# Patient Record
Sex: FEMALE | Race: WHITE | Employment: FULL TIME | URBAN - METROPOLITAN AREA
[De-identification: names, ages, dates, MRNs, and addresses within clinical notes are randomized per-mention and may not be internally consistent; named-entity substitution may affect disease eponyms.]

---

## 2024-08-18 ENCOUNTER — HOSPITAL ENCOUNTER (EMERGENCY)
Age: 49
Discharge: HOME | End: 2024-08-18
Payer: COMMERCIAL

## 2024-08-18 ENCOUNTER — APPOINTMENT (OUTPATIENT)
Dept: GENERAL RADIOLOGY | Age: 49
End: 2024-08-18
Payer: COMMERCIAL

## 2024-08-18 ENCOUNTER — APPOINTMENT (OUTPATIENT)
Dept: CT IMAGING | Age: 49
End: 2024-08-18
Payer: COMMERCIAL

## 2024-08-18 VITALS
RESPIRATION RATE: 16 BRPM | TEMPERATURE: 98.1 F | HEIGHT: 68 IN | BODY MASS INDEX: 42.44 KG/M2 | WEIGHT: 280 LBS | OXYGEN SATURATION: 99 % | HEART RATE: 66 BPM | DIASTOLIC BLOOD PRESSURE: 72 MMHG | SYSTOLIC BLOOD PRESSURE: 122 MMHG

## 2024-08-18 DIAGNOSIS — S80.01XA CONTUSION OF RIGHT KNEE, INITIAL ENCOUNTER: ICD-10-CM

## 2024-08-18 DIAGNOSIS — S39.012A STRAIN OF LUMBAR REGION, INITIAL ENCOUNTER: ICD-10-CM

## 2024-08-18 DIAGNOSIS — S76.912A MUSCLE STRAIN OF LEFT THIGH, INITIAL ENCOUNTER: Primary | ICD-10-CM

## 2024-08-18 LAB
HCG, URINE, POC: NEGATIVE
Lab: NORMAL
NEGATIVE QC PASS/FAIL: NORMAL
POSITIVE QC PASS/FAIL: NORMAL

## 2024-08-18 PROCEDURE — 73562 X-RAY EXAM OF KNEE 3: CPT

## 2024-08-18 PROCEDURE — 96372 THER/PROPH/DIAG INJ SC/IM: CPT

## 2024-08-18 PROCEDURE — 73590 X-RAY EXAM OF LOWER LEG: CPT

## 2024-08-18 PROCEDURE — 73502 X-RAY EXAM HIP UNI 2-3 VIEWS: CPT

## 2024-08-18 PROCEDURE — 73552 X-RAY EXAM OF FEMUR 2/>: CPT

## 2024-08-18 PROCEDURE — 6370000000 HC RX 637 (ALT 250 FOR IP): Performed by: NURSE PRACTITIONER

## 2024-08-18 PROCEDURE — 72131 CT LUMBAR SPINE W/O DYE: CPT

## 2024-08-18 PROCEDURE — 6360000002 HC RX W HCPCS: Performed by: NURSE PRACTITIONER

## 2024-08-18 PROCEDURE — 99284 EMERGENCY DEPT VISIT MOD MDM: CPT

## 2024-08-18 RX ORDER — KETOROLAC TROMETHAMINE 30 MG/ML
30 INJECTION, SOLUTION INTRAMUSCULAR; INTRAVENOUS ONCE
Status: COMPLETED | OUTPATIENT
Start: 2024-08-18 | End: 2024-08-18

## 2024-08-18 RX ORDER — CYCLOBENZAPRINE HCL 10 MG
10 TABLET ORAL ONCE
Status: COMPLETED | OUTPATIENT
Start: 2024-08-18 | End: 2024-08-18

## 2024-08-18 RX ORDER — CYCLOBENZAPRINE HCL 5 MG
5 TABLET ORAL 2 TIMES DAILY PRN
Qty: 8 TABLET | Refills: 0 | Status: SHIPPED | OUTPATIENT
Start: 2024-08-18 | End: 2024-08-22

## 2024-08-18 RX ORDER — IBUPROFEN 800 MG/1
800 TABLET ORAL EVERY 8 HOURS PRN
Qty: 12 TABLET | Refills: 0 | Status: SHIPPED | OUTPATIENT
Start: 2024-08-18 | End: 2024-08-22

## 2024-08-18 RX ADMIN — CYCLOBENZAPRINE 10 MG: 10 TABLET, FILM COATED ORAL at 14:19

## 2024-08-18 RX ADMIN — KETOROLAC TROMETHAMINE 30 MG: 30 INJECTION, SOLUTION INTRAMUSCULAR at 14:18

## 2024-08-18 ASSESSMENT — PAIN SCALES - GENERAL
PAINLEVEL_OUTOF10: 8
PAINLEVEL_OUTOF10: 6

## 2024-08-18 ASSESSMENT — PAIN DESCRIPTION - ORIENTATION
ORIENTATION: LEFT
ORIENTATION: LEFT

## 2024-08-18 ASSESSMENT — PAIN DESCRIPTION - LOCATION
LOCATION: LEG;HIP
LOCATION: LEG

## 2024-08-18 ASSESSMENT — PAIN DESCRIPTION - DESCRIPTORS: DESCRIPTORS: BURNING;SHOOTING;SHARP

## 2024-08-18 ASSESSMENT — PAIN - FUNCTIONAL ASSESSMENT: PAIN_FUNCTIONAL_ASSESSMENT: 0-10

## 2024-08-18 NOTE — ED PROVIDER NOTES
acute bony abnormality.   3. Mild to moderate degenerative changes seen within the left ankle.         XR FEMUR LEFT (MIN 2 VIEWS)   Final Result   1. No acute bony abnormality seen within the pelvis, left hip, left femur,   left knee, left tibia and fibula, or right knee.   2. Moderate degenerative changes seen within the left knee and right knee.   No joint effusion or acute bony abnormality.   3. Mild to moderate degenerative changes seen within the left ankle.         XR HIP 2-3 VW W PELVIS LEFT   Final Result   1. No acute bony abnormality seen within the pelvis, left hip, left femur,   left knee, left tibia and fibula, or right knee.   2. Moderate degenerative changes seen within the left knee and right knee.   No joint effusion or acute bony abnormality.   3. Mild to moderate degenerative changes seen within the left ankle.         XR KNEE LEFT (3 VIEWS)   Final Result   1. No acute bony abnormality seen within the pelvis, left hip, left femur,   left knee, left tibia and fibula, or right knee.   2. Moderate degenerative changes seen within the left knee and right knee.   No joint effusion or acute bony abnormality.   3. Mild to moderate degenerative changes seen within the left ankle.         XR TIBIA FIBULA LEFT (2 VIEWS)   Final Result   1. No acute bony abnormality seen within the pelvis, left hip, left femur,   left knee, left tibia and fibula, or right knee.   2. Moderate degenerative changes seen within the left knee and right knee.   No joint effusion or acute bony abnormality.   3. Mild to moderate degenerative changes seen within the left ankle.         CT LUMBAR SPINE WO CONTRAST   Final Result   Unremarkable non-contrast CT of the lumbar spine.           ED Course / Medical Decision Making     Medications   ketorolac (TORADOL) injection 30 mg (30 mg IntraMUSCular Given 8/18/24 1418)   cyclobenzaprine (FLEXERIL) tablet 10 mg (10 mg Oral Given 8/18/24 1419)     ED Course as of 08/18/24 1915   Sun

## 2024-08-18 NOTE — DISCHARGE INSTRUCTIONS
XR KNEE RIGHT (3 VIEWS)   Final Result   1. No acute bony abnormality seen within the pelvis, left hip, left femur,   left knee, left tibia and fibula, or right knee.   2. Moderate degenerative changes seen within the left knee and right knee.   No joint effusion or acute bony abnormality.   3. Mild to moderate degenerative changes seen within the left ankle.         XR FEMUR LEFT (MIN 2 VIEWS)   Final Result   1. No acute bony abnormality seen within the pelvis, left hip, left femur,   left knee, left tibia and fibula, or right knee.   2. Moderate degenerative changes seen within the left knee and right knee.   No joint effusion or acute bony abnormality.   3. Mild to moderate degenerative changes seen within the left ankle.         XR HIP 2-3 VW W PELVIS LEFT   Final Result   1. No acute bony abnormality seen within the pelvis, left hip, left femur,   left knee, left tibia and fibula, or right knee.   2. Moderate degenerative changes seen within the left knee and right knee.   No joint effusion or acute bony abnormality.   3. Mild to moderate degenerative changes seen within the left ankle.         XR KNEE LEFT (3 VIEWS)   Final Result   1. No acute bony abnormality seen within the pelvis, left hip, left femur,   left knee, left tibia and fibula, or right knee.   2. Moderate degenerative changes seen within the left knee and right knee.   No joint effusion or acute bony abnormality.   3. Mild to moderate degenerative changes seen within the left ankle.         XR TIBIA FIBULA LEFT (2 VIEWS)   Final Result   1. No acute bony abnormality seen within the pelvis, left hip, left femur,   left knee, left tibia and fibula, or right knee.   2. Moderate degenerative changes seen within the left knee and right knee.   No joint effusion or acute bony abnormality.   3. Mild to moderate degenerative changes seen within the left ankle.         CT LUMBAR SPINE WO CONTRAST   Final Result   Unremarkable non-contrast CT of the

## 2024-10-05 ENCOUNTER — HOSPITAL ENCOUNTER (EMERGENCY)
Age: 49
Discharge: HOME | End: 2024-10-05
Payer: COMMERCIAL

## 2024-10-05 ENCOUNTER — APPOINTMENT (OUTPATIENT)
Dept: GENERAL RADIOLOGY | Age: 49
End: 2024-10-05
Payer: COMMERCIAL

## 2024-10-05 VITALS
SYSTOLIC BLOOD PRESSURE: 155 MMHG | HEART RATE: 87 BPM | TEMPERATURE: 97.1 F | HEIGHT: 69 IN | RESPIRATION RATE: 14 BRPM | BODY MASS INDEX: 41.47 KG/M2 | OXYGEN SATURATION: 99 % | DIASTOLIC BLOOD PRESSURE: 94 MMHG | WEIGHT: 280 LBS

## 2024-10-05 DIAGNOSIS — M25.561 ACUTE PAIN OF RIGHT KNEE: ICD-10-CM

## 2024-10-05 DIAGNOSIS — M17.11 TRICOMPARTMENT OSTEOARTHRITIS OF RIGHT KNEE: ICD-10-CM

## 2024-10-05 DIAGNOSIS — S93.402A SPRAIN OF LEFT ANKLE, UNSPECIFIED LIGAMENT, INITIAL ENCOUNTER: Primary | ICD-10-CM

## 2024-10-05 DIAGNOSIS — M19.072 ARTHRITIS OF LEFT ANKLE: ICD-10-CM

## 2024-10-05 PROCEDURE — 99283 EMERGENCY DEPT VISIT LOW MDM: CPT

## 2024-10-05 PROCEDURE — 73562 X-RAY EXAM OF KNEE 3: CPT

## 2024-10-05 PROCEDURE — 73610 X-RAY EXAM OF ANKLE: CPT

## 2024-10-05 PROCEDURE — 6370000000 HC RX 637 (ALT 250 FOR IP): Performed by: NURSE PRACTITIONER

## 2024-10-05 RX ORDER — ACETAMINOPHEN 500 MG
500 TABLET ORAL 4 TIMES DAILY PRN
Qty: 20 TABLET | Refills: 1 | Status: SHIPPED | OUTPATIENT
Start: 2024-10-05

## 2024-10-05 RX ORDER — HYDROCODONE BITARTRATE AND ACETAMINOPHEN 5; 325 MG/1; MG/1
1 TABLET ORAL EVERY 8 HOURS PRN
Qty: 9 TABLET | Refills: 0 | Status: SHIPPED | OUTPATIENT
Start: 2024-10-05 | End: 2024-10-08

## 2024-10-05 RX ORDER — OXYCODONE HYDROCHLORIDE 5 MG/1
5 TABLET ORAL ONCE
Status: COMPLETED | OUTPATIENT
Start: 2024-10-05 | End: 2024-10-05

## 2024-10-05 RX ADMIN — OXYCODONE HYDROCHLORIDE 5 MG: 5 TABLET ORAL at 20:24

## 2024-10-05 ASSESSMENT — PAIN DESCRIPTION - DESCRIPTORS
DESCRIPTORS: SHOOTING;BURNING
DESCRIPTORS: BURNING;SHARP;SHOOTING

## 2024-10-05 ASSESSMENT — LIFESTYLE VARIABLES
HOW OFTEN DO YOU HAVE A DRINK CONTAINING ALCOHOL: NEVER
HOW MANY STANDARD DRINKS CONTAINING ALCOHOL DO YOU HAVE ON A TYPICAL DAY: PATIENT DOES NOT DRINK

## 2024-10-05 ASSESSMENT — PAIN DESCRIPTION - ORIENTATION
ORIENTATION: LEFT

## 2024-10-05 ASSESSMENT — PAIN DESCRIPTION - LOCATION
LOCATION: ANKLE

## 2024-10-05 ASSESSMENT — PAIN DESCRIPTION - PAIN TYPE: TYPE: ACUTE PAIN

## 2024-10-05 ASSESSMENT — PAIN SCALES - GENERAL
PAINLEVEL_OUTOF10: 8
PAINLEVEL_OUTOF10: 7
PAINLEVEL_OUTOF10: 7

## 2024-10-05 ASSESSMENT — PAIN - FUNCTIONAL ASSESSMENT: PAIN_FUNCTIONAL_ASSESSMENT: 0-10

## 2024-10-06 NOTE — ED PROVIDER NOTES
reevaluation    NEW MEDICATIONS     Discharge Medication List as of 10/5/2024  9:45 PM        START taking these medications    Details   HYDROcodone-acetaminophen (NORCO) 5-325 MG per tablet Take 1 tablet by mouth every 8 hours as needed for Pain for up to 3 days. Intended supply: 3 days. Take lowest dose possible to manage pain Max Daily Amount: 3 tablets, Disp-9 tablet, R-0Normal      acetaminophen (TYLENOL) 500 MG tablet Take 1 tablet by mouth 4 times daily as needed for Pain, Disp-20 tablet, R-1Normal           Electronically signed by JULIANA Contreras CNP   DD: 10/5/24  **This report was transcribed using voice recognition software. Every effort was made to ensure accuracy; however, inadvertent computerized transcription errors may be present.  END OF ED PROVIDER NOTE       Olga Mendoza APRN - CNP  10/06/24 0318

## 2024-10-06 NOTE — ED NOTES
Discharge instructions discussed and reviewed with the patient.  She verbalized understanding and has no questions or concerns at time of discharge.